# Patient Record
Sex: MALE | Race: WHITE | NOT HISPANIC OR LATINO
[De-identification: names, ages, dates, MRNs, and addresses within clinical notes are randomized per-mention and may not be internally consistent; named-entity substitution may affect disease eponyms.]

---

## 2017-09-28 ENCOUNTER — APPOINTMENT (OUTPATIENT)
Dept: INTERNAL MEDICINE | Facility: CLINIC | Age: 62
End: 2017-09-28
Payer: COMMERCIAL

## 2017-09-28 ENCOUNTER — NON-APPOINTMENT (OUTPATIENT)
Age: 62
End: 2017-09-28

## 2017-09-28 VITALS — HEIGHT: 68 IN | BODY MASS INDEX: 37.89 KG/M2 | WEIGHT: 250 LBS

## 2017-09-28 VITALS — HEART RATE: 80 BPM | SYSTOLIC BLOOD PRESSURE: 122 MMHG | RESPIRATION RATE: 14 BRPM | DIASTOLIC BLOOD PRESSURE: 78 MMHG

## 2017-09-28 DIAGNOSIS — Z82.49 FAMILY HISTORY OF ISCHEMIC HEART DISEASE AND OTHER DISEASES OF THE CIRCULATORY SYSTEM: ICD-10-CM

## 2017-09-28 DIAGNOSIS — Z23 ENCOUNTER FOR IMMUNIZATION: ICD-10-CM

## 2017-09-28 DIAGNOSIS — F17.200 NICOTINE DEPENDENCE, UNSPECIFIED, UNCOMPLICATED: ICD-10-CM

## 2017-09-28 DIAGNOSIS — F15.90 OTHER STIMULANT USE, UNSPECIFIED, UNCOMPLICATED: ICD-10-CM

## 2017-09-28 DIAGNOSIS — Z12.5 ENCOUNTER FOR SCREENING FOR MALIGNANT NEOPLASM OF PROSTATE: ICD-10-CM

## 2017-09-28 DIAGNOSIS — Z12.11 ENCOUNTER FOR SCREENING FOR MALIGNANT NEOPLASM OF COLON: ICD-10-CM

## 2017-09-28 DIAGNOSIS — R53.83 OTHER FATIGUE: ICD-10-CM

## 2017-09-28 DIAGNOSIS — R63.5 ABNORMAL WEIGHT GAIN: ICD-10-CM

## 2017-09-28 DIAGNOSIS — N52.9 MALE ERECTILE DYSFUNCTION, UNSPECIFIED: ICD-10-CM

## 2017-09-28 DIAGNOSIS — R10.12 LEFT UPPER QUADRANT PAIN: ICD-10-CM

## 2017-09-28 DIAGNOSIS — H93.19 TINNITUS, UNSPECIFIED EAR: ICD-10-CM

## 2017-09-28 DIAGNOSIS — Z91.89 OTHER SPECIFIED PERSONAL RISK FACTORS, NOT ELSEWHERE CLASSIFIED: ICD-10-CM

## 2017-09-28 DIAGNOSIS — Z87.828 PERSONAL HISTORY OF OTHER (HEALED) PHYSICAL INJURY AND TRAUMA: ICD-10-CM

## 2017-09-28 DIAGNOSIS — Z87.01 PERSONAL HISTORY OF PNEUMONIA (RECURRENT): ICD-10-CM

## 2017-09-28 DIAGNOSIS — H54.7 UNSPECIFIED VISUAL LOSS: ICD-10-CM

## 2017-09-28 DIAGNOSIS — Z00.00 ENCOUNTER FOR GENERAL ADULT MEDICAL EXAMINATION W/OUT ABNORMAL FINDINGS: ICD-10-CM

## 2017-09-28 PROCEDURE — 90732 PPSV23 VACC 2 YRS+ SUBQ/IM: CPT

## 2017-09-28 PROCEDURE — 99386 PREV VISIT NEW AGE 40-64: CPT | Mod: 25

## 2017-09-28 PROCEDURE — 36415 COLL VENOUS BLD VENIPUNCTURE: CPT

## 2017-09-28 PROCEDURE — G0009: CPT

## 2017-09-28 PROCEDURE — 93000 ELECTROCARDIOGRAM COMPLETE: CPT

## 2017-09-29 DIAGNOSIS — R79.89 OTHER SPECIFIED ABNORMAL FINDINGS OF BLOOD CHEMISTRY: ICD-10-CM

## 2017-09-29 DIAGNOSIS — E78.5 HYPERLIPIDEMIA, UNSPECIFIED: ICD-10-CM

## 2017-09-29 LAB
25(OH)D3 SERPL-MCNC: 31.7 NG/ML
ALBUMIN SERPL ELPH-MCNC: 4.4 G/DL
ALP BLD-CCNC: 61 U/L
ALT SERPL-CCNC: 25 U/L
ANION GAP SERPL CALC-SCNC: 16 MMOL/L
AST SERPL-CCNC: 20 U/L
BASOPHILS # BLD AUTO: 0.03 K/UL
BASOPHILS NFR BLD AUTO: 0.5 %
BILIRUB SERPL-MCNC: 0.2 MG/DL
BUN SERPL-MCNC: 11 MG/DL
CALCIUM SERPL-MCNC: 9.1 MG/DL
CHLORIDE SERPL-SCNC: 103 MMOL/L
CHOLEST SERPL-MCNC: 218 MG/DL
CHOLEST/HDLC SERPL: 6.4 RATIO
CO2 SERPL-SCNC: 24 MMOL/L
CREAT SERPL-MCNC: 0.78 MG/DL
EOSINOPHIL # BLD AUTO: 0.07 K/UL
EOSINOPHIL NFR BLD AUTO: 1.2 %
GLUCOSE SERPL-MCNC: 112 MG/DL
HBA1C MFR BLD HPLC: 5.6 %
HCT VFR BLD CALC: 42 %
HCV AB SER QL: NONREACTIVE
HCV S/CO RATIO: 0.12 S/CO
HDLC SERPL-MCNC: 34 MG/DL
HGB BLD-MCNC: 14 G/DL
IMM GRANULOCYTES NFR BLD AUTO: 0.3 %
LDLC SERPL CALC-MCNC: 127 MG/DL
LYMPHOCYTES # BLD AUTO: 1.65 K/UL
LYMPHOCYTES NFR BLD AUTO: 27.5 %
MAN DIFF?: NORMAL
MCHC RBC-ENTMCNC: 31.2 PG
MCHC RBC-ENTMCNC: 33.3 GM/DL
MCV RBC AUTO: 93.5 FL
MONOCYTES # BLD AUTO: 0.37 K/UL
MONOCYTES NFR BLD AUTO: 6.2 %
NEUTROPHILS # BLD AUTO: 3.85 K/UL
NEUTROPHILS NFR BLD AUTO: 64.3 %
PLATELET # BLD AUTO: 190 K/UL
POTASSIUM SERPL-SCNC: 4.3 MMOL/L
PROT SERPL-MCNC: 7.2 G/DL
PSA SERPL-MCNC: 1.17 NG/ML
RBC # BLD: 4.49 M/UL
RBC # FLD: 14.7 %
SODIUM SERPL-SCNC: 143 MMOL/L
TESTOST SERPL-MCNC: 171.3 NG/DL
TRIGL SERPL-MCNC: 283 MG/DL
TSH SERPL-ACNC: 3.1 UIU/ML
WBC # FLD AUTO: 5.99 K/UL

## 2018-07-23 PROBLEM — R79.89 LOW TESTOSTERONE: Status: ACTIVE | Noted: 2017-09-29

## 2022-04-20 ENCOUNTER — APPOINTMENT (OUTPATIENT)
Dept: ORTHOPEDIC SURGERY | Facility: CLINIC | Age: 67
End: 2022-04-20
Payer: MEDICARE

## 2022-04-20 VITALS
HEART RATE: 96 BPM | HEIGHT: 68 IN | WEIGHT: 230 LBS | DIASTOLIC BLOOD PRESSURE: 94 MMHG | SYSTOLIC BLOOD PRESSURE: 170 MMHG | BODY MASS INDEX: 34.86 KG/M2

## 2022-04-20 DIAGNOSIS — M25.551 PAIN IN RIGHT HIP: ICD-10-CM

## 2022-04-20 PROCEDURE — 73502 X-RAY EXAM HIP UNI 2-3 VIEWS: CPT

## 2022-04-20 PROCEDURE — 99205 OFFICE O/P NEW HI 60 MIN: CPT

## 2022-04-20 NOTE — HISTORY OF PRESENT ILLNESS
[de-identified] : Patient is a 67-year-old male presenting for evaluation of longstanding right hip pain now progressively worsening.  His right hip pain is localized to the right groin and is worse with all weightbearing activity including walking long distance and rising from a seated position.  In addition to this he has a great deal of stiffness which now interferes with ADLs such putting on socks and shoes.  In the past has been diagnosed osteoarthritis and treated conservatively thus far.  He has not had injections thus far.  He has gone to physical therapy without significant improvement.  Takes anti-inflammatories and Tylenol without significant benefit.  He is status post left total hip replacement with Dr. Hernandez 8 years ago which is doing well.

## 2022-04-20 NOTE — PHYSICAL EXAM
[de-identified] : The patient appears well nourished  and in no apparent distress.  The patient is alert and oriented to person, place, and time.   Affect and mood appear normal. The head is normocephalic and atraumatic.  The eyes reveal normal sclera and extra ocular muscles are intact. The tongue is midline with no apparent lesions.  Skin shows normal turgor with no evidence of eczema or psoriasis.  No respiratory distress noted.  Sensation grossly intact.		  [de-identified] : Exam of the right hip shows hip flexion of 80 degrees with 20 degrees of obligatory external rotation, further hip external rotation of 30 degrees. Patient can perform a straight leg raise with difficulty.\par Patients left leg is 1 inch shorter than his right leg.\par 5/5 motor strength bilaterally distally. Sensation intact distally.		  [de-identified] : X-ray: AP view of the pelvis and 2 views of the right hip demonstrate bone on bone arthritis.

## 2022-04-20 NOTE — CONSULT LETTER
[Dear  ___] : Dear  [unfilled], [Consult Letter:] : I had the pleasure of evaluating your patient, [unfilled]. [Please see my note below.] : Please see my note below. [Consult Closing:] : Thank you very much for allowing me to participate in the care of this patient.  If you have any questions, please do not hesitate to contact me. [Sincerely,] : Sincerely, [FreeTextEntry2] : BRITT HUMPHRIES MD \par  [FreeTextEntry3] : Issa Herrera MD\par Chief of Joint Replacement\par [facility] \par [facility]\par

## 2022-04-20 NOTE — DISCUSSION/SUMMARY
[de-identified] : YOLANDA KAUR is a 67 year male who presents with right hip bone on bone arthritis.  A discussion was had with the patient regarding a right total hip replacement.  Anterior and posterior exposures were discussed. For this patient an anterior approach maybe appropriate if the patient loses weight (he wants an anterior approach - his other hip was done anteriorly but his weight at that time was 25lbs less). A long discussion was had with the patient as what the total joint replacement would entail. A model was used to demonstrate the operation and to discuss bearing surfaces of the implants. The hospitalization and rehabilitation were discussed. The use of perioperative antibiotics and DVT prophylaxis were discussed. The risks, benefits and alternatives to surgical intervention were discussed at length with the patient. Specific risks discussed included: infection, wound breakdown, numbness and damage to nerves, tendon, muscle, arteries or other blood vessels, and the possibility of leg length discrepancy. The possibility of recurrent pain, no improvement in pain and actual worsening of the pain were also mentioned in conversation with the patient. Medical complications related to the patient's general medical health including deep vein thrombosis, pulmonary embolism, heart attack, stroke, death and other complications from anesthesia were discussed as well. The patient was told that we will take steps to minimize these risks by using sterile technique, antibiotics and DVT prophylaxis when appropriate and following the patient postoperatively in the clinic setting to monitor progress. The benefits of surgery were discussed with the patient including the potential to improve the current clinical condition through operative intervention. Alternatives to surgical intervention include continued conservative management which may yield less than optimal results in this particular patient. All questions were answered to the satisfaction of the patient. Models were used as an educational tool. We did discuss implant choices and fixation, with shared decision making with the patient.	\par \par We are planning for robotic assistance for the total hip replacement. We discussed that this will require placement of iliac crest pins in the contralateral iliac crest for pelvic bone registration to allow for robotic guidance for the surgery. We will utilize robotic assistance to improve accuracy of the implants, and accurate restoration of both leg lengths and femoral offset.		 	\par \par The patient is very interested in the anterior approach.  Given his current weight and abdominal pannus it would be safer with a posterior approach.  The patient is going to work on weight loss and we will reevaluate him 3 weeks before surgery to confirm that we can proceed with an anterior approach as he wishes.\par

## 2022-04-20 NOTE — ADDENDUM
[FreeTextEntry1] : This note was authored by Parker Hodgson working as a medical scribe for Dr. Issa Herrera. The note was reviewed, edited, and revised by Dr. Issa Herrera whom is in agreement with the exam findings, imaging findings, and treatment plan. 04/20/2022

## 2022-06-09 ENCOUNTER — APPOINTMENT (OUTPATIENT)
Dept: CT IMAGING | Facility: CLINIC | Age: 67
End: 2022-06-09
Payer: MEDICARE

## 2022-06-09 ENCOUNTER — APPOINTMENT (OUTPATIENT)
Dept: ORTHOPEDIC SURGERY | Facility: CLINIC | Age: 67
End: 2022-06-09
Payer: MEDICARE

## 2022-06-09 ENCOUNTER — OUTPATIENT (OUTPATIENT)
Dept: OUTPATIENT SERVICES | Facility: HOSPITAL | Age: 67
LOS: 1 days | End: 2022-06-09
Payer: MEDICARE

## 2022-06-09 ENCOUNTER — APPOINTMENT (OUTPATIENT)
Dept: RADIOLOGY | Facility: CLINIC | Age: 67
End: 2022-06-09
Payer: MEDICARE

## 2022-06-09 VITALS
HEART RATE: 80 BPM | OXYGEN SATURATION: 98 % | RESPIRATION RATE: 20 BRPM | HEIGHT: 68 IN | SYSTOLIC BLOOD PRESSURE: 132 MMHG | WEIGHT: 250.22 LBS | TEMPERATURE: 98 F | DIASTOLIC BLOOD PRESSURE: 92 MMHG

## 2022-06-09 VITALS
WEIGHT: 250 LBS | SYSTOLIC BLOOD PRESSURE: 124 MMHG | BODY MASS INDEX: 37.89 KG/M2 | DIASTOLIC BLOOD PRESSURE: 50 MMHG | HEART RATE: 108 BPM | HEIGHT: 68 IN

## 2022-06-09 DIAGNOSIS — U07.1 COVID-19: ICD-10-CM

## 2022-06-09 DIAGNOSIS — Z96.642 PRESENCE OF LEFT ARTIFICIAL HIP JOINT: Chronic | ICD-10-CM

## 2022-06-09 DIAGNOSIS — Z98.890 OTHER SPECIFIED POSTPROCEDURAL STATES: Chronic | ICD-10-CM

## 2022-06-09 DIAGNOSIS — Z29.9 ENCOUNTER FOR PROPHYLACTIC MEASURES, UNSPECIFIED: ICD-10-CM

## 2022-06-09 DIAGNOSIS — M16.11 UNILATERAL PRIMARY OSTEOARTHRITIS, RIGHT HIP: ICD-10-CM

## 2022-06-09 DIAGNOSIS — I10 ESSENTIAL (PRIMARY) HYPERTENSION: ICD-10-CM

## 2022-06-09 DIAGNOSIS — M19.90 UNSPECIFIED OSTEOARTHRITIS, UNSPECIFIED SITE: ICD-10-CM

## 2022-06-09 DIAGNOSIS — Z01.818 ENCOUNTER FOR OTHER PREPROCEDURAL EXAMINATION: ICD-10-CM

## 2022-06-09 LAB
A1C WITH ESTIMATED AVERAGE GLUCOSE RESULT: 5.6 % — SIGNIFICANT CHANGE UP (ref 4–5.6)
ANION GAP SERPL CALC-SCNC: 13 MMOL/L — SIGNIFICANT CHANGE UP (ref 5–17)
APTT BLD: 30.3 SEC — SIGNIFICANT CHANGE UP (ref 27.5–35.5)
BASOPHILS # BLD AUTO: 0.04 K/UL — SIGNIFICANT CHANGE UP (ref 0–0.2)
BASOPHILS NFR BLD AUTO: 0.6 % — SIGNIFICANT CHANGE UP (ref 0–2)
BLD GP AB SCN SERPL QL: SIGNIFICANT CHANGE UP
BUN SERPL-MCNC: 14.4 MG/DL — SIGNIFICANT CHANGE UP (ref 8–20)
CALCIUM SERPL-MCNC: 9.4 MG/DL — SIGNIFICANT CHANGE UP (ref 8.6–10.2)
CHLORIDE SERPL-SCNC: 99 MMOL/L — SIGNIFICANT CHANGE UP (ref 98–107)
CO2 SERPL-SCNC: 26 MMOL/L — SIGNIFICANT CHANGE UP (ref 22–29)
CREAT SERPL-MCNC: 0.66 MG/DL — SIGNIFICANT CHANGE UP (ref 0.5–1.3)
EGFR: 103 ML/MIN/1.73M2 — SIGNIFICANT CHANGE UP
EOSINOPHIL # BLD AUTO: 0.1 K/UL — SIGNIFICANT CHANGE UP (ref 0–0.5)
EOSINOPHIL NFR BLD AUTO: 1.6 % — SIGNIFICANT CHANGE UP (ref 0–6)
ESTIMATED AVERAGE GLUCOSE: 114 MG/DL — SIGNIFICANT CHANGE UP (ref 68–114)
GLUCOSE SERPL-MCNC: 100 MG/DL — HIGH (ref 70–99)
HCT VFR BLD CALC: 44.9 % — SIGNIFICANT CHANGE UP (ref 39–50)
HGB BLD-MCNC: 14.8 G/DL — SIGNIFICANT CHANGE UP (ref 13–17)
IMM GRANULOCYTES NFR BLD AUTO: 0.6 % — SIGNIFICANT CHANGE UP (ref 0–1.5)
INR BLD: 1 RATIO — SIGNIFICANT CHANGE UP (ref 0.88–1.16)
LYMPHOCYTES # BLD AUTO: 1.5 K/UL — SIGNIFICANT CHANGE UP (ref 1–3.3)
LYMPHOCYTES # BLD AUTO: 23.5 % — SIGNIFICANT CHANGE UP (ref 13–44)
MCHC RBC-ENTMCNC: 30 PG — SIGNIFICANT CHANGE UP (ref 27–34)
MCHC RBC-ENTMCNC: 33 GM/DL — SIGNIFICANT CHANGE UP (ref 32–36)
MCV RBC AUTO: 90.9 FL — SIGNIFICANT CHANGE UP (ref 80–100)
MONOCYTES # BLD AUTO: 0.48 K/UL — SIGNIFICANT CHANGE UP (ref 0–0.9)
MONOCYTES NFR BLD AUTO: 7.5 % — SIGNIFICANT CHANGE UP (ref 2–14)
MRSA PCR RESULT.: SIGNIFICANT CHANGE UP
NEUTROPHILS # BLD AUTO: 4.22 K/UL — SIGNIFICANT CHANGE UP (ref 1.8–7.4)
NEUTROPHILS NFR BLD AUTO: 66.2 % — SIGNIFICANT CHANGE UP (ref 43–77)
PLATELET # BLD AUTO: 195 K/UL — SIGNIFICANT CHANGE UP (ref 150–400)
POTASSIUM SERPL-MCNC: 5.2 MMOL/L — SIGNIFICANT CHANGE UP (ref 3.5–5.3)
POTASSIUM SERPL-SCNC: 5.2 MMOL/L — SIGNIFICANT CHANGE UP (ref 3.5–5.3)
PROTHROM AB SERPL-ACNC: 11.6 SEC — SIGNIFICANT CHANGE UP (ref 10.5–13.4)
RBC # BLD: 4.94 M/UL — SIGNIFICANT CHANGE UP (ref 4.2–5.8)
RBC # FLD: 14.8 % — HIGH (ref 10.3–14.5)
S AUREUS DNA NOSE QL NAA+PROBE: SIGNIFICANT CHANGE UP
SODIUM SERPL-SCNC: 138 MMOL/L — SIGNIFICANT CHANGE UP (ref 135–145)
WBC # BLD: 6.38 K/UL — SIGNIFICANT CHANGE UP (ref 3.8–10.5)
WBC # FLD AUTO: 6.38 K/UL — SIGNIFICANT CHANGE UP (ref 3.8–10.5)

## 2022-06-09 PROCEDURE — 99213 OFFICE O/P EST LOW 20 MIN: CPT

## 2022-06-09 PROCEDURE — 73700 CT LOWER EXTREMITY W/O DYE: CPT | Mod: 26,RT,ME

## 2022-06-09 PROCEDURE — G1004: CPT

## 2022-06-09 PROCEDURE — 71046 X-RAY EXAM CHEST 2 VIEWS: CPT | Mod: 26

## 2022-06-09 PROCEDURE — 73700 CT LOWER EXTREMITY W/O DYE: CPT | Mod: ME

## 2022-06-09 PROCEDURE — 72190 X-RAY EXAM OF PELVIS: CPT

## 2022-06-09 PROCEDURE — 72190 X-RAY EXAM OF PELVIS: CPT | Mod: 26

## 2022-06-09 PROCEDURE — 93010 ELECTROCARDIOGRAM REPORT: CPT

## 2022-06-09 PROCEDURE — G0463: CPT

## 2022-06-09 PROCEDURE — 93005 ELECTROCARDIOGRAM TRACING: CPT

## 2022-06-09 PROCEDURE — 71046 X-RAY EXAM CHEST 2 VIEWS: CPT

## 2022-06-09 NOTE — DISCUSSION/SUMMARY
[de-identified] : YOLANDA KAUR is a 67 year old male who presents with right hip bone on bone arthritis in preparation for a right THR. The patient was counseled on smoking cessation prior to surgery. We discussed that his risk of perioperative complication is drastically higher if he is smoking and that we require smoking cessation prior to elective right THR replacement surgery. The patient will continue to work on weight loss in the hopes of being able to undergo THR surgery with an anterior approach.  He lives fairly far away so it is difficult for him to get here for follow-up appointments.  Ideally I would like to see him 1 week preop for final weight check and determination of an anterior versus posterior approach however because he is traveling from a distance we will plan to evaluate him in the preoperative holding area on the day of surgery to confirm that we can still proceed with an anterior approach.  He is very adamant about having an anterior approach since he did well after his left side without approach.  We discussed the importance of continuing to work on weight loss and that his risk is still elevated because of his elevated BMI.  He also has to quit smoking.  He understands all this.

## 2022-06-09 NOTE — H&P PST ADULT - NSICDXPASTMEDICALHX_GEN_ALL_CORE_FT
PAST MEDICAL HISTORY:  2019 novel coronavirus disease (COVID-19) jan 2022    H/O tinnitus     OA (osteoarthritis)

## 2022-06-09 NOTE — HISTORY OF PRESENT ILLNESS
[de-identified] : Patient is a 67-year-old male presenting for evaluation of longstanding right hip pain now progressively worsening.  His right hip pain is localized to the right groin and is worse with all weightbearing activity including walking long distance and rising from a seated position.  In addition to this he has a great deal of stiffness which now interferes with ADLs such putting on socks and shoes.  In the past has been diagnosed osteoarthritis and treated conservatively thus far.  He has not had injections thus far.  He has gone to physical therapy without significant improvement.  Takes anti-inflammatories and Tylenol without significant benefit.  He is status post left total hip replacement with Dr. Hernandez 8 years ago which is doing well. Patient is indicated for Right THR on 6/29/22. He has lost 22 lbs since last visit and currently weighs 246lbs with a BMI of 37.5, he presents for final discussion on approach of surgery.

## 2022-06-09 NOTE — H&P PST ADULT - NSICDXPASTSURGICALHX_GEN_ALL_CORE_FT
PAST SURGICAL HISTORY:  H/O arthroscopy right knee 2002    H/O carpal tunnel repair     History of left hip replacement 2016

## 2022-06-09 NOTE — ADDENDUM
[FreeTextEntry1] : This note was authored by Parker Hodgson working as a medical scribe for Dr. Issa Herrera. The note was reviewed, edited, and revised by Dr. Issa Herrera whom is in agreement with the exam findings, imaging findings, and treatment plan. 06/09/2022

## 2022-06-09 NOTE — H&P PST ADULT - ASSESSMENT
67 yr old male presents with c/o right groin, and hip pain that radiates to thigh. Pt was walking 5 miles daily until he fell 1 yr ago and developed right hip PAin that resolved until a few months ago when pain became worse and activity is now limited , also wakes at night with discomfort. Ambulates without assistance, takes motrin as needed with temporary relief. 5/10 pain today.  Pt had Covid  in  , states he treated at home.  Pt had his left hip replaced in  and aníbal well. Xray done and pt states he has OA and is now scheduled for right hip replacement with DR. Herrera. Pre op PCR to be done , pt has no PCP is calling today to find one for medical clearance will call us with info.   Patient was given information on joint class, joint book provided, ERP protocol reviewed with patient, MSSA/MRSA swabbed in PST, results pending    OPIOID RISK TOOL    MAGED EACH BOX THAT APPLIES AND ADD TOTALS AT THE END    FAMILY HISTORY OF SUBSTANCE ABUSE                 FEMALE         MALE                                                Alcohol                             [  ]1 pt          [  ]3pts                                               Illegal Durgs                     [  ]2 pts        [  ]3pts                                               Rx Drugs                           [  ]4 pts        [  ]4 pts    PERSONAL HISTORY OF SUBSTANCE ABUSE                                                                                          Alcohol                             [  ]3 pts       [  ]3 pts                                               Illegal Durgs                     [  ]4 pts        [  ]4 pts                                               Rx Drugs                           [  ]5 pts        [  ]5 pts    AGE BETWEEN 16-45 YEARS                                      [  ]1 pt         [  ]1 pt    HISTORY OF PREADOLESCENT   SEXUAL ABUSE                                                             [  ]3 pts        [  ]0pts    PSYCHOLOGICAL DISEASE                     ADD, OCD, Bipolar, Schizophrenia        [  ]2 pts         [  ]2 pts                      Depression                                               [  ]1 pt           [  ]1 pt           SCORING TOTAL   (add numbers and type here)              (**0*)                                     A score of 3 or lower indicated LOW risk for future opiod abuse  A score of 4 to 7 indicated moderate risk for future opiod abuse  A score of 8 or higher indicates a high risk for opiod abuse  CAPRINI VTE 2.0 SCORE [CLOT updated 2019]    AGE RELATED RISK FACTORS                                                       MOBILITY RELATED FACTORS  [ ] Age 41-60 years                                            (1 Point)                    [ ] Bed rest                                                        (1 Point)  [ X] Age: 61-74 years                                           (2 Points)                  [ ] Plaster cast                                                   (2 Points)  [ ] Age= 75 years                                              (3 Points)                    [ ] Bed bound for more than 72 hours                 (2 Points)    DISEASE RELATED RISK FACTORS                                               GENDER SPECIFIC FACTORS  [ ] Edema in the lower extremities                       (1 Point)              [ ] Pregnancy                                                     (1 Point)  [ ] Varicose veins                                               (1 Point)                     [ ] Post-partum < 6 weeks                                   (1 Point)             X[ ] BMI > 25 Kg/m2                                            (1 Point)                     [ ] Hormonal therapy  or oral contraception          (1 Point)                 [ ] Sepsis (in the previous month)                        (1 Point)               [ ] History of pregnancy complications                 (1 point)  [ ] Pneumonia or serious lung disease                                               [ ] Unexplained or recurrent                     (1 Point)           (in the previous month)                               (1 Point)  [ ] Abnormal pulmonary function test                     (1 Point)                 SURGERY RELATED RISK FACTORS  [ ] Acute myocardial infarction                              (1 Point)               [ ]  Section                                             (1 Point)  [ ] Congestive heart failure (in the previous month)  (1 Point)      [ ] Minor surgery                                                  (1 Point)   [ ] Inflammatory bowel disease                             (1 Point)               [ ] Arthroscopic surgery                                        (2 Points)  [ ] Central venous access                                      (2 Points)                [ ] General surgery lasting more than 45 minutes (2 points)  [ ] Malignancy- Present or previous                   (2 Points)                [ X] Elective arthroplasty                                         (5 points)    [ ] Stroke (in the previous month)                          (5 Points)                                                                                                                                                           HEMATOLOGY RELATED FACTORS                                                 TRAUMA RELATED RISK FACTORS  [ ] Prior episodes of VTE                                     (3 Points)                [ ] Fracture of the hip, pelvis, or leg                       (5 Points)  [ ] Positive family history for VTE                         (3 Points)             [ ] Acute spinal cord injury (in the previous month)  (5 Points)  [ ] Prothrombin 80733 A                                     (3 Points)               [ ] Paralysis  (less than 1 month)                             (5 Points)  [ ] Factor V Leiden                                             (3 Points)                  [ ] Multiple Trauma within 1 month                        (5 Points)  [ ] Lupus anticoagulants                                     (3 Points)                                                           [ ] Anticardiolipin antibodies                               (3 Points)                                                       [ ] High homocysteine in the blood                      (3 Points)                                             [ ] Other congenital or acquired thrombophilia      (3 Points)                                                [ ] Heparin induced thrombocytopenia                  (3 Points)                                     Total Score [      8    ]

## 2022-06-09 NOTE — PHYSICAL EXAM
[de-identified] : The patient appears well nourished  and in no apparent distress.  The patient is alert and oriented to person, place, and time.   Affect and mood appear normal. The head is normocephalic and atraumatic.  The eyes reveal normal sclera and extra ocular muscles are intact. The tongue is midline with no apparent lesions.  Skin shows normal turgor with no evidence of eczema or psoriasis.  No respiratory distress noted.  Sensation grossly intact.		  [de-identified] : Exam of the right hip shows hip flexion of 80 degrees with 20 degrees of obligatory external rotation, further hip external rotation of 30 degrees. Patient can perform a straight leg raise with difficulty.\par Patients left leg is 1 inch shorter than his right leg.\par 5/5 motor strength bilaterally distally. Sensation intact distally. 	  [de-identified] : X-ray: AP view of the pelvis and 2 views of the right hip demonstrate bone on bone arthritis.

## 2022-06-09 NOTE — H&P PST ADULT - HISTORY OF PRESENT ILLNESS
67 yr old male presents with c/o right groin, and hip pain that radiates to thigh. Pt was walking 5 miles daily until he fell 1 yr ago and developed right hip PAin that resolved until a few months ago when pain became worse and activity is now limited , also wakes at night with discomfort. Ambulates without assistance, takes motrin as needed with temporary relief. 5/10 pain today.  Pt had Covid 2022 in [place] , states he treated at home.  Pt had his left hip replaced in 2016 and aníbal well. Xray done and pt states he has OA and is now scheduled for right hip replacement with DR. Herrera.

## 2022-06-13 DIAGNOSIS — Z01.818 ENCOUNTER FOR OTHER PREPROCEDURAL EXAMINATION: ICD-10-CM

## 2022-06-28 ENCOUNTER — FORM ENCOUNTER (OUTPATIENT)
Age: 67
End: 2022-06-28

## 2022-06-28 ENCOUNTER — TRANSCRIPTION ENCOUNTER (OUTPATIENT)
Age: 67
End: 2022-06-28

## 2022-06-29 ENCOUNTER — TRANSCRIPTION ENCOUNTER (OUTPATIENT)
Age: 67
End: 2022-06-29

## 2022-06-29 ENCOUNTER — APPOINTMENT (OUTPATIENT)
Dept: ORTHOPEDIC SURGERY | Facility: HOSPITAL | Age: 67
End: 2022-06-29

## 2022-06-29 ENCOUNTER — OUTPATIENT (OUTPATIENT)
Dept: OUTPATIENT SERVICES | Facility: HOSPITAL | Age: 67
LOS: 1 days | End: 2022-06-29
Payer: MEDICARE

## 2022-06-29 DIAGNOSIS — Z98.890 OTHER SPECIFIED POSTPROCEDURAL STATES: Chronic | ICD-10-CM

## 2022-06-29 DIAGNOSIS — Z96.642 PRESENCE OF LEFT ARTIFICIAL HIP JOINT: Chronic | ICD-10-CM

## 2022-06-29 PROCEDURE — 0055T BONE SRGRY CMPTR CT/MRI IMAG: CPT | Mod: AS,RT

## 2022-06-29 PROCEDURE — 27130 TOTAL HIP ARTHROPLASTY: CPT | Mod: AS,RT

## 2022-06-29 DEVICE — LINER ACET TRIDENT X3 0 DEG 36MM E: Type: IMPLANTABLE DEVICE | Status: FUNCTIONAL

## 2022-06-29 DEVICE — SHELL ACET TRIDENT II E 52MM: Type: IMPLANTABLE DEVICE | Status: FUNCTIONAL

## 2022-06-29 DEVICE — SCREW HEX LO PROF 6.5X15MM: Type: IMPLANTABLE DEVICE | Status: FUNCTIONAL

## 2022-06-29 DEVICE — SEALANT FLOSEAL FAST PREP HEMOSTATIC MATRIX 10ML: Type: IMPLANTABLE DEVICE | Status: FUNCTIONAL

## 2022-06-29 DEVICE — FEM TIB CHECKPOINT STRL: Type: IMPLANTABLE DEVICE | Status: FUNCTIONAL

## 2022-06-29 DEVICE — FEM HD CERAMIC ART BIOLOX DELTA: Type: IMPLANTABLE DEVICE | Status: FUNCTIONAL

## 2022-06-29 DEVICE — STEM FEM ACTIS HIGH COLLAR SZ 3: Type: IMPLANTABLE DEVICE | Status: FUNCTIONAL

## 2022-06-29 DEVICE — BONE WAX: Type: IMPLANTABLE DEVICE | Status: FUNCTIONAL

## 2022-06-29 DEVICE — PIN SLFTP STRL 4X170MM: Type: IMPLANTABLE DEVICE | Status: FUNCTIONAL

## 2022-06-29 DEVICE — CEMENT BONE PALACOS R W/O GENTAMICIN 1X40: Type: IMPLANTABLE DEVICE | Status: FUNCTIONAL

## 2022-06-29 DEVICE — SCREW HEX LO PROF 6.5X40MM: Type: IMPLANTABLE DEVICE | Status: FUNCTIONAL

## 2022-06-29 DEVICE — HEX CHECKPOINT IMPACTION STRL 3.5MM: Type: IMPLANTABLE DEVICE | Status: FUNCTIONAL

## 2022-06-30 ENCOUNTER — TRANSCRIPTION ENCOUNTER (OUTPATIENT)
Age: 67
End: 2022-06-30

## 2022-06-30 PROCEDURE — C1776: CPT

## 2022-06-30 PROCEDURE — 97110 THERAPEUTIC EXERCISES: CPT

## 2022-06-30 PROCEDURE — 86850 RBC ANTIBODY SCREEN: CPT

## 2022-06-30 PROCEDURE — 82962 GLUCOSE BLOOD TEST: CPT

## 2022-06-30 PROCEDURE — 0055T BONE SRGRY CMPTR CT/MRI IMAG: CPT

## 2022-06-30 PROCEDURE — 85027 COMPLETE CBC AUTOMATED: CPT

## 2022-06-30 PROCEDURE — 86901 BLOOD TYPING SEROLOGIC RH(D): CPT

## 2022-06-30 PROCEDURE — 36415 COLL VENOUS BLD VENIPUNCTURE: CPT

## 2022-06-30 PROCEDURE — C1713: CPT

## 2022-06-30 PROCEDURE — 86900 BLOOD TYPING SEROLOGIC ABO: CPT

## 2022-06-30 PROCEDURE — 27130 TOTAL HIP ARTHROPLASTY: CPT | Mod: RT

## 2022-06-30 PROCEDURE — S2900: CPT

## 2022-06-30 PROCEDURE — 80048 BASIC METABOLIC PNL TOTAL CA: CPT

## 2022-06-30 PROCEDURE — 97116 GAIT TRAINING THERAPY: CPT

## 2022-06-30 PROCEDURE — C1889: CPT

## 2022-06-30 PROCEDURE — 73502 X-RAY EXAM HIP UNI 2-3 VIEWS: CPT

## 2022-06-30 RX ORDER — IBUPROFEN 200 MG
1 TABLET ORAL
Qty: 0 | Refills: 0 | DISCHARGE

## 2022-07-05 DIAGNOSIS — M16.11 UNILATERAL PRIMARY OSTEOARTHRITIS, RIGHT HIP: ICD-10-CM

## 2022-07-05 PROBLEM — U07.1 COVID-19: Chronic | Status: ACTIVE | Noted: 2022-06-09

## 2022-07-05 PROBLEM — Z86.69 PERSONAL HISTORY OF OTHER DISEASES OF THE NERVOUS SYSTEM AND SENSE ORGANS: Chronic | Status: ACTIVE | Noted: 2022-06-09

## 2022-07-05 PROBLEM — M19.90 UNSPECIFIED OSTEOARTHRITIS, UNSPECIFIED SITE: Chronic | Status: ACTIVE | Noted: 2022-06-09

## 2022-07-22 ENCOUNTER — APPOINTMENT (OUTPATIENT)
Dept: ORTHOPEDIC SURGERY | Facility: CLINIC | Age: 67
End: 2022-07-22

## 2022-08-11 ENCOUNTER — APPOINTMENT (OUTPATIENT)
Dept: ORTHOPEDIC SURGERY | Facility: CLINIC | Age: 67
End: 2022-08-11

## 2023-09-21 ASSESSMENT — HOOS JR
BENDING TO THE FLOOR TO PICK UP OBJECT: MODERATE
WALKING ON UNEVEN SURFACE: SEVERE
GOING UP OR DOWN STAIRS: SEVERE
IMPORTED LATERALITY: RIGHT
IMPORTED FORM: YES
HOOS JR RAW SCORE: 16
IMPORTED HOOS JR SCORE: 16.0
LYING IN BED (TURNING OVER, MAINTAINING HIP POSITION): SEVERE
RISING FROM SITTING: SEVERE
SITTING: MODERATE

## 2023-12-31 PROBLEM — Z23 NEED FOR 23-POLYVALENT PNEUMOCOCCAL POLYSACCHARIDE VACCINE: Status: ACTIVE | Noted: 2017-09-28

## (undated) DEVICE — GLV 8 PROTEXIS

## (undated) DEVICE — DRAPE HALF SHEET 40X57"

## (undated) DEVICE — DRAPE SURGICAL #1010

## (undated) DEVICE — DRAPE IOBAN 23X33"

## (undated) DEVICE — POSITIONER FOAM EGG CRATE ULNAR (2PCS)

## (undated) DEVICE — SYR ASEPTO

## (undated) DEVICE — WRAP COMPRESSION CALF MED

## (undated) DEVICE — SUT DERMABOND 0.7ML

## (undated) DEVICE — WAVEGUIDE ILLUMINATOR EIGR SABER W YANKAUER TAPERED TIP

## (undated) DEVICE — SUT MONOCRYL 3-0 27" PS-2 UNDYED

## (undated) DEVICE — DRSG IMMOBILIZER SHOULDER QUICK RELEASE LG

## (undated) DEVICE — PACK TOTAL HIP CUST

## (undated) DEVICE — SEALER BIPOLAR 6.0 AQUAMANTYS

## (undated) DEVICE — DRSG KERLIX MED 6"

## (undated) DEVICE — NDL HYPO SAFE 22G X 1.5"

## (undated) DEVICE — SUT SILK 2-0 18" TIES

## (undated) DEVICE — NDL HYPO SAFE 20G X 1.5"

## (undated) DEVICE — BLANKET WARMER UPPER ADULT

## (undated) DEVICE — DRAPE C ARM UNIVERSAL

## (undated) DEVICE — SUT STRATAFIX SPIRAL PDO 1 36CM CTX

## (undated) DEVICE — DRSG COBAN 4"

## (undated) DEVICE — DRAPE SHEET XL 77X98"

## (undated) DEVICE — SUT ETHIBOND 2 30" V37

## (undated) DEVICE — DRAPE 3/4 SHEET 52X76"

## (undated) DEVICE — ELCTR EDGE BOVIE COATED BLADE TIP 4"

## (undated) DEVICE — SUT VICRYL 2-0 27" CT-2 UNDYED

## (undated) DEVICE — DRAPE IOBAN 17X23"

## (undated) DEVICE — IRRISEPT JET LAVAGE W 0.05 PCT CHG

## (undated) DEVICE — DRAPE TOWEL BLUE STICKY

## (undated) DEVICE — FRAZIER SUCTION TIP 10FR

## (undated) DEVICE — GLV 8 DURAPRENE

## (undated) DEVICE — KIT TRACKING HIP PROC HIZADISC STRL

## (undated) DEVICE — SYR LUER LOK 30CC

## (undated) DEVICE — SOL BETADINE POUCH 0.75OZ STERILE

## (undated) DEVICE — SPONGE RAYTEC 4X4 16PLY

## (undated) DEVICE — SUT VICRYL 3-0 18" SH UNDYED

## (undated) DEVICE — DRAPE U LONG 47X70"

## (undated) DEVICE — MAKO DRAPE KIT

## (undated) DEVICE — GLV COTTON LG STERILE

## (undated) DEVICE — HOOD FLYTE STRYKER SURGICOOL W PEELAWAY